# Patient Record
Sex: MALE | Race: WHITE
[De-identification: names, ages, dates, MRNs, and addresses within clinical notes are randomized per-mention and may not be internally consistent; named-entity substitution may affect disease eponyms.]

---

## 2019-10-16 ENCOUNTER — HOSPITAL ENCOUNTER (EMERGENCY)
Dept: HOSPITAL 56 - MW.ED | Age: 34
Discharge: HOME | End: 2019-10-16
Payer: COMMERCIAL

## 2019-10-16 DIAGNOSIS — I47.1: Primary | ICD-10-CM

## 2019-10-16 DIAGNOSIS — I95.9: ICD-10-CM

## 2019-10-16 LAB
BUN SERPL-MCNC: 12 MG/DL (ref 7–18)
CHLORIDE SERPL-SCNC: 105 MMOL/L (ref 98–107)
CO2 SERPL-SCNC: 22 MMOL/L (ref 21–32)
GLUCOSE SERPL-MCNC: 91 MG/DL (ref 74–106)
POTASSIUM SERPL-SCNC: 3.8 MMOL/L (ref 3.5–5.1)
SODIUM SERPL-SCNC: 140 MMOL/L (ref 136–148)

## 2019-10-16 PROCEDURE — 71045 X-RAY EXAM CHEST 1 VIEW: CPT

## 2019-10-16 PROCEDURE — 36415 COLL VENOUS BLD VENIPUNCTURE: CPT

## 2019-10-16 PROCEDURE — 96361 HYDRATE IV INFUSION ADD-ON: CPT

## 2019-10-16 PROCEDURE — 93005 ELECTROCARDIOGRAM TRACING: CPT

## 2019-10-16 PROCEDURE — 85025 COMPLETE CBC W/AUTO DIFF WBC: CPT

## 2019-10-16 PROCEDURE — 96375 TX/PRO/DX INJ NEW DRUG ADDON: CPT

## 2019-10-16 PROCEDURE — 99291 CRITICAL CARE FIRST HOUR: CPT

## 2019-10-16 PROCEDURE — 84484 ASSAY OF TROPONIN QUANT: CPT

## 2019-10-16 PROCEDURE — 80053 COMPREHEN METABOLIC PANEL: CPT

## 2019-10-16 PROCEDURE — 85610 PROTHROMBIN TIME: CPT

## 2019-10-16 PROCEDURE — 96374 THER/PROPH/DIAG INJ IV PUSH: CPT

## 2019-10-16 NOTE — CR
Chest: Portable view of the chest was obtained.



Comparison: No prior chest x-ray.



Heart size and mediastinum are normal.  Lungs are clear.  Bony structures are 
grossly intact.



Impression:  Nothing acute is seen on portable chest x-ray.



Diagnostic code #1
MTDD

## 2019-10-16 NOTE — EDM.PDOC
ED HPI GENERAL MEDICAL PROBLEM





- General


Chief Complaint: Cardiovascular Problem


Stated Complaint: HEART COMPLAINT


Time Seen by Provider: 10/16/19 16:25


Source of Information: Reports: Patient





- History of Present Illness


INITIAL COMMENTS - FREE TEXT/NARRATIVE: 





HISTORY AND PHYSICAL:


History of present illness:


[Patient with SVT presents with palpitation/heart racing sensation since this 

morning





No fever nausea vomiting chills sweats no chest pain on arrival no shortness 

breath headache dizziness





Patient was provided adenosine 2 doses as well as Cardizem and then was 

hypotensive and developed some mild chest discomfort hence synchronize 

cardioversion was attempted at 100 J then followed by 150 with conversion no 

complication no complaint








Patient has history of SVT almost daily which usually resolves on its own 

within 5 minutes sometimes the patient does use vagal maneuvers





Review of systems: 


As per history of present illness and below otherwise all systems reviewed and 

negative.


Past medical history: 


As per history of present illness and as reviewed below otherwise 

noncontributory.


Surgical history: 


As per history of present illness and as reviewed below otherwise 

noncontributory.


Social history: 


No reported history of drug or alcohol abuse.


Family history: 


As per history of present illness and as reviewed below otherwise 

noncontributory.





Physical exam:


HEENT: Atraumatic, normocephalic, pupils reactive, negative for conjunctival 

pallor or scleral icterus, mucous membranes moist, throat clear, neck supple, 

nontender, trachea midline.


Lungs: Clear to auscultation, breath sounds equal bilaterally, chest nontender.


Heart: S1S2, regular, negative for clicks, rubs, or JVD.


Abdomen: Soft, nondistended, nontender. Negative for masses or 

hepatosplenomegaly. Negative for costovertebral tenderness.


Pelvis: Stable nontender.


Genitourinary: Deferred.


Rectal: Deferred.


Extremities: Atraumatic, negative for cords or calf pain. Neurovascular 

unremarkable.


Neuro: Awake, alert, oriented. Cranial nerves II through XII unremarkable. 

Cerebellum unremarkable. Motor and sensory unremarkable throughout. Exam 

nonfocal.





Diagnostics:


[CBC CMP UA troponin


EKG


Chest 1 view


EKG repeat





Therapeutics:


[ normal saline


Vagal maneuvers attempted


Adenosine 6, adenosine 12, Cardizem 25 IV ]


Versed 2 mg, morphine offered however patient refused morphine due to drug 

abuse history


Arterial version 100 J than 150 J with success





Patient offered observation admission however refused








Impression: 


[ SVT -resolved


Hypotension resolved


]


Definitive disposition and diagnosis as appropriate pending reevaluation and 

review of above.





- Related Data


 Allergies











Allergy/AdvReac Type Severity Reaction Status Date / Time


 


No Known Allergies Allergy   Verified 10/16/19 16:04











Home Meds: 


 Home Meds





. [No Known Home Meds]  10/16/19 [History]











ED ROS GENERAL





- Review of Systems


Review Of Systems: See Below





ED EXAM, GENERAL





- Physical Exam


Exam: See Below





Course





- Vital Signs


Last Recorded V/S: 


 Last Vital Signs











Temp  96.4 F   10/16/19 15:24


 


Pulse  195 H  10/16/19 15:24


 


Resp  20   10/16/19 15:24


 


BP  137/70   10/16/19 15:24


 


Pulse Ox  97   10/16/19 15:24














- Orders/Labs/Meds


Orders: 


 Active Orders 24 hr











 Category Date Time Status


 


 EKG 12 Lead [EKG Documentation Completion] [RC] STAT Care  10/16/19 16:37 

Active


 


 EKG Documentation Completion [RC] STAT Care  10/16/19 15:34 Active


 


 DRUG SCREEN, URINE [URCHEM] Stat Lab  10/16/19 15:49 Ordered











Labs: 


 Laboratory Tests











  10/16/19 10/16/19 10/16/19 Range/Units





  15:42 15:42 15:42 


 


WBC  10.65    (4.0-11.0)  K/uL


 


RBC  5.50    (4.50-5.90)  M/uL


 


Hgb  16.2    (13.0-17.0)  g/dL


 


Hct  47.2    (38.0-50.0)  %


 


MCV  85.8    (80.0-98.0)  fL


 


MCH  29.5    (27.0-32.0)  pg


 


MCHC  34.3    (31.0-37.0)  g/dL


 


RDW Std Deviation  42.0    (28.0-62.0)  fl


 


RDW Coeff of Christiano  14    (11.0-15.0)  %


 


Plt Count  310    (150-400)  K/uL


 


MPV  9.20    (7.40-12.00)  fL


 


Neut % (Auto)  53.1    (48.0-80.0)  %


 


Lymph % (Auto)  36.0    (16.0-40.0)  %


 


Mono % (Auto)  9.4    (0.0-15.0)  %


 


Eos % (Auto)  1.1    (0.0-7.0)  %


 


Baso % (Auto)  0.4    (0.0-1.5)  %


 


Neut # (Auto)  5.7    (1.4-5.7)  K/uL


 


Lymph # (Auto)  3.8 H    (0.6-2.4)  K/uL


 


Mono # (Auto)  1.0 H    (0.0-0.8)  K/uL


 


Eos # (Auto)  0.1    (0.0-0.7)  K/uL


 


Baso # (Auto)  0.0    (0.0-0.1)  K/uL


 


Nucleated RBC %  0.0    /100WBC


 


Nucleated RBCs #  0    K/uL


 


INR   1.06   


 


Sodium    140  (136-148)  mmol/L


 


Potassium    3.8  (3.5-5.1)  mmol/L


 


Chloride    105  ()  mmol/L


 


Carbon Dioxide    22.0  (21.0-32.0)  mmol/L


 


BUN    12  (7.0-18.0)  mg/dL


 


Creatinine    1.4 H  (0.8-1.3)  mg/dL


 


Est Cr Clr Drug Dosing    83.23  mL/min


 


Estimated GFR (MDRD)    57.7  ml/min


 


Glucose    91  ()  mg/dL


 


Calcium    9.5  (8.5-10.1)  mg/dL


 


Total Bilirubin    0.4  (0.2-1.0)  mg/dL


 


AST    36  (15-37)  IU/L


 


ALT    50  (14-63)  IU/L


 


Alkaline Phosphatase    57  ()  U/L


 


Troponin I    < 0.050  (0.000-0.056)  ng/mL


 


Total Protein    8.1  (6.4-8.2)  g/dL


 


Albumin    4.1  (3.4-5.0)  g/dL


 


Globulin    4.0  (2.6-4.0)  g/dL


 


Albumin/Globulin Ratio    1.0  (0.9-1.6)  











Meds: 


Medications














Discontinued Medications














Generic Name Dose Route Start Last Admin





  Trade Name Freq  PRN Reason Stop Dose Admin


 


Adenosine  Confirm  10/16/19 15:30  10/16/19 16:17





  Adenocard  Administered  10/16/19 15:31  Not Given





  Dose   





  6 mg   





  .ROUTE   





  .STK-MED ONE   





     





     





     





     


 


Adenosine  Confirm  10/16/19 15:33  10/16/19 16:18





  Adenocard  Administered  10/16/19 15:34  Not Given





  Dose   





  6 mg   





  .ROUTE   





  .STK-MED ONE   





     





     





     





     


 


Adenosine  6 mg  10/16/19 16:11  10/16/19 16:21





  Adenocard  IVPUSH  10/16/19 16:12  6 mg





  NOW ONE   Administration





     





     





     





     


 


Adenosine  12 mg  10/16/19 16:12  10/16/19 15:39





  Adenocard  IVPUSH  10/16/19 16:13  12 mg





  NOW ONE   Administration





     





     





     





     


 


Diltiazem HCl  Confirm  10/16/19 15:41  10/16/19 16:18





  Diltiazem  Administered  10/16/19 15:42  Not Given





  Dose   





  25 mg   





  .ROUTE   





  .STK-MED ONE   





     





     





     





     


 


Diltiazem HCl  Confirm  10/16/19 15:54  10/16/19 16:18





  Diltiazem  Administered  10/16/19 15:55  Not Given





  Dose   





  25 mg   





  .ROUTE   





  .STK-MED ONE   





     





     





     





     


 


Diltiazem HCl  Confirm  10/16/19 15:55  10/16/19 16:18





  Diltiazem  Administered  10/16/19 15:56  Not Given





  Dose   





  25 mg   





  .ROUTE   





  .STK-MED ONE   





     





     





     





     


 


Diltiazem HCl  25 mg  10/16/19 16:11  10/16/19 16:23





  Diltiazem  IVPUSH  10/16/19 16:12  25 mg





  ONETIME ONE   Administration





     





     





     





     


 


Diltiazem HCl  50 mg  10/16/19 16:13  10/16/19 16:25





  Diltiazem  IVPUSH  10/16/19 16:14  Not Given





  ONETIME ONE   





     





     





     





     


 


Sodium Chloride  1,000 mls @ 999 mls/hr  10/16/19 15:34  10/16/19 16:20





  Normal Saline  IV  10/16/19 16:34  999 mls/hr





  STAT ONE   Administration





     





     





     





     


 


Midazolam HCl  Confirm  10/16/19 15:58  10/16/19 16:17





  Versed 1 Mg/Ml  Administered  10/16/19 15:59  Not Given





  Dose   





  2 mg   





  .ROUTE   





  .STK-MED ONE   





     





     





     





     


 


Midazolam HCl  2 mg  10/16/19 16:12  10/16/19 16:23





  Versed 1 Mg/Ml  IVPUSH  10/16/19 16:13  2 mg





  ONETIME ONE   Administration





     





     





     





     


 


Morphine Sulfate  Confirm  10/16/19 15:58  10/16/19 16:17





  Morphine  Administered  10/16/19 15:59  Not Given





  Dose   





  2 mg   





  .ROUTE   





  .STK-MED ONE   





     





     





     





     


 


Morphine Sulfate  2 mg  10/16/19 16:12  10/16/19 16:25





  Morphine  IVPUSH  10/16/19 16:13  Not Given





  ONETIME ONE   





     





     





     





     














Departure





- Departure


Time of Disposition: 16:50


Disposition: Home, Self-Care 01


Condition: Good


Clinical Impression: 


 SVT (supraventricular tachycardia)





Forms:  ED Department Discharge


Additional Instructions: 


The following information is given to patients seen in the emergency department 

who are being discharged to home. This information is to outline your options 

for follow-up care. We provide all patients seen in our emergency department 

with a follow-up referral.





The need for follow-up, as well as the timing and circumstances, are variable 

depending upon the specifics of your emergency department visit.





If you don't have a primary care physician on staff, we will provide you with a 

referral. We always advise you to contact your personal physician following an 

emergency department visit to inform them of the circumstance of the visit and 

for follow-up with them and/or the need for any referrals to a consulting 

specialist.





The emergency department will also refer you to a specialist when appropriate. 

This referral assures that you have the opportunity for follow-up care with a 

specialist. All of these measure are taken in an effort to provide you with 

optimal care, which includes your follow-up.





Under all circumstances we always encourage you to contact your private 

physician who remains a resource for coordinating your care. When calling for 

follow-up care, please make the office aware that this follow-up is from your 

recent emergency room visit. If for any reason you are refused follow-up, 

please contact the [facility] emergency department at [phone] 

and asked to speak to the emergency department charge nurse.








- My Orders


Last 24 Hours: 


My Active Orders





10/16/19 15:34


EKG Documentation Completion [RC] STAT 





10/16/19 15:49


DRUG SCREEN, URINE [URCHEM] Stat 





10/16/19 16:37


EKG 12 Lead [EKG Documentation Completion] [RC] STAT 














- Assessment/Plan


Last 24 Hours: 


My Active Orders





10/16/19 15:34


EKG Documentation Completion [RC] STAT 





10/16/19 15:49


DRUG SCREEN, URINE [URCHEM] Stat 





10/16/19 16:37


EKG 12 Lead [EKG Documentation Completion] [RC] STAT